# Patient Record
Sex: FEMALE | Race: WHITE | NOT HISPANIC OR LATINO
[De-identification: names, ages, dates, MRNs, and addresses within clinical notes are randomized per-mention and may not be internally consistent; named-entity substitution may affect disease eponyms.]

---

## 2017-03-06 ENCOUNTER — HOSPITAL (OUTPATIENT)
Dept: OTHER | Age: 52
End: 2017-03-06
Attending: OBSTETRICS & GYNECOLOGY

## 2018-03-07 ENCOUNTER — HOSPITAL (OUTPATIENT)
Dept: OTHER | Age: 53
End: 2018-03-07
Attending: OBSTETRICS & GYNECOLOGY

## 2019-03-11 ENCOUNTER — HOSPITAL (OUTPATIENT)
Dept: OTHER | Age: 54
End: 2019-03-11
Attending: OBSTETRICS & GYNECOLOGY

## 2019-03-28 ENCOUNTER — GENETICS ENCOUNTER (OUTPATIENT)
Dept: GENETICS | Facility: HOSPITAL | Age: 54
End: 2019-03-28
Attending: GENETIC COUNSELOR, MS
Payer: COMMERCIAL

## 2019-03-28 PROCEDURE — 96040 HC GENETIC COUNSELING EA 30 MIN: CPT | Performed by: GENETIC COUNSELOR, MS

## 2019-03-28 NOTE — PROGRESS NOTES
Referring Provider: Eduardo Shepherd MD    Reason for Referral:  Fortunato Coyle was referred for genetic counseling because of a family history of cancer  Ms. Singh is a 52year-old woman of [facility] [place].  Ms. Abena Lehman has no personal history o genetic predisposition to developing cancer.  Such characteristics include having multiple family members on the same side developing cancer, individuals developing cancer at a younger age, and individuals who have developed a rare or uncommon type of cance impact management for a patient who tests positive for one of these cancer predisposition genes. If Ms. Singh is found to carry a deleterious pathogenic variant in a cancer predisposition gene (positive result), she is at significantly increased ris physician. A variant of uncertain significance is a DNA change that may or may not alter the function of the gene; therefore, it is usually not possible to determine if the gene variant is responsible for an individual’s increased cancer risk.      Can variants as part of a multigene panel is recommended. At the conclusion of the counseling session MsCatalina Samantha decided to proceed with a [facility] Common Hereditary Cancer gene panel. Written consent was obtained.   Blood and paperwork were sent to [facility]

## 2019-04-12 ENCOUNTER — GENETICS ENCOUNTER (OUTPATIENT)
Dept: HEMATOLOGY/ONCOLOGY | Facility: HOSPITAL | Age: 54
End: 2019-04-12

## 2019-04-12 NOTE — PROGRESS NOTES
Referring Provider:       Duane Adam MD    Reason for Referral:  Paresh Tapia had genetic testing performed on 3/28/19 because of a family history of breast cancer.      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

## 2019-04-16 ENCOUNTER — GENETICS ENCOUNTER (OUTPATIENT)
Dept: GENETICS | Facility: HOSPITAL | Age: 54
End: 2019-04-16
Attending: GENETIC COUNSELOR, MS
Payer: COMMERCIAL

## 2019-04-16 PROCEDURE — 96040 HC GENETIC COUNSELING EA 30 MIN: CPT | Performed by: GENETIC COUNSELOR, MS

## 2019-04-16 NOTE — PROGRESS NOTES
Referring Provider:       Mg Law MD     Reason for Referral:  Marvin Steen returned today to review the results of her 3/28/19 genetic testing. To briefly review, Ms. Singh is a 52year-old woman who has no personal history of cancer who was future.     Medical Recommendations for Carriers of RAD51D Pathogenic Variants:  Current NCCN guidelines (Version 3.2019 Genetic/Familial High-Risk assessment:  Breast and Ovarian) suggest that female RAD51D carriers should consider risk-reducing salpingo-o ovarian cancer. Current NCCN Guidelines recommend that RAD51D carriers should discuss risk-reducing salpingo-oophorectomy beginning around age 36-53 (or earlier if there is a family history of early-onset ovarian cancer).   MsCatalina Kourtneyjorge العلي states that she is o

## 2019-06-17 ENCOUNTER — ANESTHESIA EVENT (OUTPATIENT)
Dept: SURGERY | Facility: HOSPITAL | Age: 54
End: 2019-06-17
Payer: COMMERCIAL

## 2019-06-18 ENCOUNTER — HOSPITAL ENCOUNTER (OUTPATIENT)
Facility: HOSPITAL | Age: 54
Setting detail: HOSPITAL OUTPATIENT SURGERY
Discharge: HOME | End: 2019-06-18
Attending: OBSTETRICS & GYNECOLOGY | Admitting: OBSTETRICS & GYNECOLOGY
Payer: COMMERCIAL

## 2019-06-18 ENCOUNTER — ANESTHESIA (OUTPATIENT)
Dept: SURGERY | Facility: HOSPITAL | Age: 54
End: 2019-06-18
Payer: COMMERCIAL

## 2019-06-18 VITALS
BODY MASS INDEX: 22.68 KG/M2 | HEIGHT: 66 IN | HEART RATE: 97 BPM | OXYGEN SATURATION: 99 % | SYSTOLIC BLOOD PRESSURE: 125 MMHG | WEIGHT: 141.13 LBS | DIASTOLIC BLOOD PRESSURE: 75 MMHG | RESPIRATION RATE: 16 BRPM | TEMPERATURE: 97 F

## 2019-06-18 PROCEDURE — 0DNW4ZZ RELEASE PERITONEUM, PERCUTANEOUS ENDOSCOPIC APPROACH: ICD-10-PCS | Performed by: OBSTETRICS & GYNECOLOGY

## 2019-06-18 PROCEDURE — 88305 TISSUE EXAM BY PATHOLOGIST: CPT | Performed by: OBSTETRICS & GYNECOLOGY

## 2019-06-18 PROCEDURE — 8E0W4CZ ROBOTIC ASSISTED PROCEDURE OF TRUNK REGION, PERCUTANEOUS ENDOSCOPIC APPROACH: ICD-10-PCS | Performed by: OBSTETRICS & GYNECOLOGY

## 2019-06-18 PROCEDURE — 0UT24ZZ RESECTION OF BILATERAL OVARIES, PERCUTANEOUS ENDOSCOPIC APPROACH: ICD-10-PCS | Performed by: OBSTETRICS & GYNECOLOGY

## 2019-06-18 PROCEDURE — 0UT74ZZ RESECTION OF BILATERAL FALLOPIAN TUBES, PERCUTANEOUS ENDOSCOPIC APPROACH: ICD-10-PCS | Performed by: OBSTETRICS & GYNECOLOGY

## 2019-06-18 RX ORDER — SODIUM CHLORIDE, SODIUM LACTATE, POTASSIUM CHLORIDE, CALCIUM CHLORIDE 600; 310; 30; 20 MG/100ML; MG/100ML; MG/100ML; MG/100ML
INJECTION, SOLUTION INTRAVENOUS CONTINUOUS
Status: DISCONTINUED | OUTPATIENT
Start: 2019-06-18 | End: 2019-06-18

## 2019-06-18 RX ORDER — LABETALOL HYDROCHLORIDE 5 MG/ML
5 INJECTION, SOLUTION INTRAVENOUS EVERY 5 MIN PRN
Status: DISCONTINUED | OUTPATIENT
Start: 2019-06-18 | End: 2019-06-18

## 2019-06-18 RX ORDER — CEFAZOLIN SODIUM/WATER 2 G/20 ML
SYRINGE (ML) INTRAVENOUS
Status: DISCONTINUED | OUTPATIENT
Start: 2019-06-18 | End: 2019-06-18 | Stop reason: HOSPADM

## 2019-06-18 RX ORDER — BUPIVACAINE HYDROCHLORIDE AND EPINEPHRINE 5; 5 MG/ML; UG/ML
INJECTION, SOLUTION EPIDURAL; INTRACAUDAL; PERINEURAL AS NEEDED
Status: DISCONTINUED | OUTPATIENT
Start: 2019-06-18 | End: 2019-06-18 | Stop reason: HOSPADM

## 2019-06-18 RX ORDER — ACETAMINOPHEN 500 MG
1000 TABLET ORAL ONCE AS NEEDED
Status: DISCONTINUED | OUTPATIENT
Start: 2019-06-18 | End: 2019-06-18

## 2019-06-18 RX ORDER — HYDROCODONE BITARTRATE AND ACETAMINOPHEN 5; 325 MG/1; MG/1
1 TABLET ORAL AS NEEDED
Status: COMPLETED | OUTPATIENT
Start: 2019-06-18 | End: 2019-06-18

## 2019-06-18 RX ORDER — ACETAMINOPHEN 500 MG
500 TABLET ORAL EVERY 6 HOURS PRN
COMMUNITY

## 2019-06-18 RX ORDER — HYDROCODONE BITARTRATE AND ACETAMINOPHEN 5; 325 MG/1; MG/1
2 TABLET ORAL AS NEEDED
Status: COMPLETED | OUTPATIENT
Start: 2019-06-18 | End: 2019-06-18

## 2019-06-18 RX ORDER — HYDROMORPHONE HYDROCHLORIDE 1 MG/ML
0.4 INJECTION, SOLUTION INTRAMUSCULAR; INTRAVENOUS; SUBCUTANEOUS EVERY 5 MIN PRN
Status: DISCONTINUED | OUTPATIENT
Start: 2019-06-18 | End: 2019-06-18

## 2019-06-18 RX ORDER — IBUPROFEN 200 MG
600 TABLET ORAL ONCE AS NEEDED
Status: DISCONTINUED | OUTPATIENT
Start: 2019-06-18 | End: 2019-06-18

## 2019-06-18 RX ORDER — DEXAMETHASONE SODIUM PHOSPHATE 4 MG/ML
4 VIAL (ML) INJECTION AS NEEDED
Status: DISCONTINUED | OUTPATIENT
Start: 2019-06-18 | End: 2019-06-18

## 2019-06-18 RX ORDER — METOCLOPRAMIDE HYDROCHLORIDE 5 MG/ML
10 INJECTION INTRAMUSCULAR; INTRAVENOUS AS NEEDED
Status: DISCONTINUED | OUTPATIENT
Start: 2019-06-18 | End: 2019-06-18

## 2019-06-18 RX ORDER — ONDANSETRON 2 MG/ML
4 INJECTION INTRAMUSCULAR; INTRAVENOUS AS NEEDED
Status: DISCONTINUED | OUTPATIENT
Start: 2019-06-18 | End: 2019-06-18

## 2019-06-18 RX ORDER — NALOXONE HYDROCHLORIDE 0.4 MG/ML
80 INJECTION, SOLUTION INTRAMUSCULAR; INTRAVENOUS; SUBCUTANEOUS AS NEEDED
Status: DISCONTINUED | OUTPATIENT
Start: 2019-06-18 | End: 2019-06-18

## 2019-06-18 RX ORDER — ACETAMINOPHEN 500 MG
1000 TABLET ORAL ONCE
Status: DISCONTINUED | OUTPATIENT
Start: 2019-06-18 | End: 2019-06-18

## 2019-06-18 NOTE — OPERATIVE REPORT
[facility]    PATIENT'S NAME: Alecia Denson   ATTENDING PHYSICIAN: Reese Otero M.D. OPERATING PHYSICIAN: Reese Otero M.D.    PATIENT ACCOUNT#:   [de-identified]    LOCATION:  [address] [place] [address] 10  MEDICAL RECORD #:   [id number] was used to insufflate the abdominopelvic cavity. The 12 mm trocar was then placed. The 5 mm video camera was placed inside the abdominopelvic cavity, and the abdominopelvic cavity was visualized.   The abdominopelvic cavity was noted to be adequately ent of the bowel on this side, and the adhesions to the pelvic sidewall of the ovary were more extensive on the patient's left, but continued dissection and lysis of adhesions were performed, and this ovary and fallopian tube were also freed.   Adequate hemosta

## 2019-06-18 NOTE — ANESTHESIA POSTPROCEDURE EVALUATION
[facility] Cortez Lindau Patient Status:  Hospital Outpatient Surgery   Age/Gender 62year old female MRN [id number]   Location [address] [facility] Attending Romy Lucero MD   Hosp Day # 0 PCP No primary care provider o

## 2019-06-18 NOTE — ANESTHESIA PREPROCEDURE EVALUATION
PRE-OP EVALUATION    Patient Name: Hola Singh    Pre-op Diagnosis: FAMILT HISTORY OF BREAST CANCER  RAD 51D  VARIANT MODERATE/HIGH RISK ,OVARIAN CANCER RISK    Procedure(s):  DAVINCI ROBOT ASSISTED LAPAROSCOPIC BILATERAL SALPINGO-OOPHORECTOMY    Surg Other findings            ASA: 1   Plan: general  NPO status verified and patient meets guidelines. Post-procedure pain management plan discussed with surgeon and patient. Comment: Plan GETA, ASA monitors, 1 PIV, routine recovery.   Risks of sore thro

## 2019-06-18 NOTE — BRIEF OP NOTE
[facility]  Post-Op Procedure Note    Mary Sauer Patient Status:  Hospital Outpatient Surgery    1965 MRN [id number]   [facility] POST ANESTHESIA CARE UNIT Attending Martin Davenport MD   Hosp Day # 0 PCP No primary care pr

## 2019-06-18 NOTE — H&P
[facility]    PATIENT'S NAME: Sheryl Bell   ATTENDING PHYSICIAN: Maria G Monique M.D.    PATIENT ACCOUNT#:   [de-identified]    LOCATION:    MEDICAL RECORD #:   [id number]       YOB: 1965  ADMISSION DATE:       06/18/2019    HISTORY drug allergies. No latex allergy. FAMILY HISTORY:  Father had throat cancer at age 76. Mother had type 2 diabetes at age 48. Grandmother also had type 2 diabetes. Her mother had breast cancer at age 58; she had bilateral mastectomies.   Father had sk has been counseled extensively regarding her options of observation versus increased surveillance versus risk-reducing bilateral salpingo-oophorectomy. The patient desires to proceed with risk-reducing bilateral salpingo-oophorectomy.   The procedure and r

## 2020-02-25 DIAGNOSIS — Z12.31 ENCOUNTER FOR SCREENING MAMMOGRAM FOR MALIGNANT NEOPLASM OF BREAST: Primary | ICD-10-CM

## 2020-03-02 ENCOUNTER — HOSPITAL ENCOUNTER (OUTPATIENT)
Dept: MAMMOGRAPHY | Age: 55
Discharge: HOME | End: 2020-03-02

## 2020-03-02 DIAGNOSIS — Z12.31 ENCOUNTER FOR SCREENING MAMMOGRAM FOR MALIGNANT NEOPLASM OF BREAST: ICD-10-CM

## 2020-03-02 PROCEDURE — 77063 BREAST TOMOSYNTHESIS BI: CPT

## 2021-03-02 ENCOUNTER — HOSPITAL ENCOUNTER (OUTPATIENT)
Dept: MAMMOGRAPHY | Age: 56
Discharge: HOME | End: 2021-03-02

## 2021-03-02 DIAGNOSIS — Z12.31 ENCOUNTER FOR SCREENING MAMMOGRAM FOR MALIGNANT NEOPLASM OF BREAST: ICD-10-CM

## 2021-03-02 PROCEDURE — 77063 BREAST TOMOSYNTHESIS BI: CPT

## 2022-03-14 ENCOUNTER — HOSPITAL ENCOUNTER (OUTPATIENT)
Dept: MAMMOGRAPHY | Age: 57
Discharge: HOME | End: 2022-03-14

## 2022-03-14 ENCOUNTER — HOSPITAL ENCOUNTER (OUTPATIENT)
Dept: GENERAL RADIOLOGY | Age: 57
Discharge: HOME | End: 2022-03-14

## 2022-03-14 DIAGNOSIS — Z13.820 SCREENING FOR OSTEOPOROSIS: ICD-10-CM

## 2022-03-14 DIAGNOSIS — Z12.39 SCREENING FOR BREAST CANCER: ICD-10-CM

## 2022-03-14 PROCEDURE — 77080 DXA BONE DENSITY AXIAL: CPT

## 2022-03-14 PROCEDURE — 77063 BREAST TOMOSYNTHESIS BI: CPT

## 2022-03-21 ENCOUNTER — HOSPITAL ENCOUNTER (OUTPATIENT)
Dept: MAMMOGRAPHY | Age: 57
Discharge: HOME | End: 2022-03-21

## 2022-03-21 ENCOUNTER — DOCUMENTATION (OUTPATIENT)
Dept: MAMMOGRAPHY | Age: 57
End: 2022-03-21

## 2022-03-21 DIAGNOSIS — R92.8 ABNORMAL SCREENING MAMMOGRAM: ICD-10-CM

## 2022-03-21 DIAGNOSIS — R92.1 BREAST CALCIFICATION, RIGHT: ICD-10-CM

## 2022-03-21 PROCEDURE — G0279 TOMOSYNTHESIS, MAMMO: HCPCS

## 2022-03-24 ENCOUNTER — HOSPITAL ENCOUNTER (OUTPATIENT)
Dept: MAMMOGRAPHY | Age: 57
Discharge: HOME | End: 2022-03-24

## 2022-03-24 ENCOUNTER — DOCUMENTATION (OUTPATIENT)
Dept: MAMMOGRAPHY | Age: 57
End: 2022-03-24

## 2022-03-24 DIAGNOSIS — R92.1 BREAST CALCIFICATION, RIGHT: ICD-10-CM

## 2022-03-24 PROCEDURE — 10006023 HB SUPPLY 272

## 2022-03-24 PROCEDURE — 88305 TISSUE EXAM BY PATHOLOGIST: CPT

## 2022-03-24 PROCEDURE — 10006027 HB SUPPLY 278

## 2022-03-24 PROCEDURE — 19081 BX BREAST 1ST LESION STRTCTC: CPT

## 2022-03-24 PROCEDURE — 10002801 HB RX 250 W/O HCPCS: Performed by: RADIOLOGY

## 2022-03-24 PROCEDURE — A4648 IMPLANTABLE TISSUE MARKER: HCPCS

## 2022-03-24 RX ORDER — LIDOCAINE HYDROCHLORIDE 20 MG/ML
1 INJECTION, SOLUTION INFILTRATION; PERINEURAL ONCE
Status: COMPLETED | OUTPATIENT
Start: 2022-03-24 | End: 2022-03-24

## 2022-03-24 RX ORDER — LIDOCAINE HYDROCHLORIDE AND EPINEPHRINE BITARTRATE 20; .01 MG/ML; MG/ML
10 INJECTION, SOLUTION SUBCUTANEOUS ONCE
Status: COMPLETED | OUTPATIENT
Start: 2022-03-24 | End: 2022-03-24

## 2022-03-24 RX ADMIN — LIDOCAINE HYDROCHLORIDE,EPINEPHRINE BITARTRATE 10 ML: 20; .01 INJECTION, SOLUTION INFILTRATION; PERINEURAL at 08:27

## 2022-03-24 RX ADMIN — LIDOCAINE HYDROCHLORIDE 20 MG: 20 INJECTION, SOLUTION INFILTRATION; PERINEURAL at 08:25

## 2022-03-25 ENCOUNTER — TELEPHONE (OUTPATIENT)
Dept: MAMMOGRAPHY | Age: 57
End: 2022-03-25

## 2022-03-25 LAB
ASR DISCLAIMER: NORMAL
CASE RPRT: NORMAL
CLINICAL INFO: NORMAL
PATH REPORT.FINAL DX SPEC: NORMAL
PATH REPORT.GROSS SPEC: NORMAL

## 2022-07-19 ENCOUNTER — OFFICE VISIT (OUTPATIENT)
Facility: LOCATION | Age: 57
End: 2022-07-19
Payer: COMMERCIAL

## 2022-07-19 VITALS
SYSTOLIC BLOOD PRESSURE: 125 MMHG | BODY MASS INDEX: 22 KG/M2 | WEIGHT: 133.19 LBS | DIASTOLIC BLOOD PRESSURE: 84 MMHG | HEART RATE: 73 BPM

## 2022-07-19 DIAGNOSIS — R63.4 UNINTENTIONAL WEIGHT LOSS: ICD-10-CM

## 2022-07-19 DIAGNOSIS — Z83.71 FAMILY HISTORY OF COLONIC POLYPS: ICD-10-CM

## 2022-07-19 DIAGNOSIS — R19.4 CHANGE IN BOWEL HABITS: Primary | ICD-10-CM

## 2022-07-19 PROCEDURE — 3074F SYST BP LT 130 MM HG: CPT

## 2022-07-19 PROCEDURE — S0285 CNSLT BEFORE SCREEN COLONOSC: HCPCS

## 2022-07-19 PROCEDURE — 3079F DIAST BP 80-89 MM HG: CPT

## 2022-07-19 RX ORDER — POLYETHYLENE GLYCOL 3350, SODIUM CHLORIDE, SODIUM BICARBONATE, POTASSIUM CHLORIDE 420; 11.2; 5.72; 1.48 G/4L; G/4L; G/4L; G/4L
POWDER, FOR SOLUTION ORAL
Qty: 1 EACH | Refills: 0 | Status: SHIPPED | OUTPATIENT
Start: 2022-07-19

## 2022-09-13 DIAGNOSIS — R63.4 UNINTENTIONAL WEIGHT LOSS: Primary | ICD-10-CM

## 2022-10-31 ENCOUNTER — TELEPHONE (OUTPATIENT)
Facility: LOCATION | Age: 57
End: 2022-10-31

## 2022-11-08 ENCOUNTER — HOSPITAL ENCOUNTER (OUTPATIENT)
Facility: HOSPITAL | Age: 57
Setting detail: HOSPITAL OUTPATIENT SURGERY
Discharge: HOME | End: 2022-11-08
Attending: COLON & RECTAL SURGERY | Admitting: COLON & RECTAL SURGERY
Payer: COMMERCIAL

## 2022-11-08 ENCOUNTER — ANESTHESIA (OUTPATIENT)
Dept: ENDOSCOPY | Facility: HOSPITAL | Age: 57
End: 2022-11-08
Payer: COMMERCIAL

## 2022-11-08 ENCOUNTER — ANESTHESIA EVENT (OUTPATIENT)
Dept: ENDOSCOPY | Facility: HOSPITAL | Age: 57
End: 2022-11-08
Payer: COMMERCIAL

## 2022-11-08 VITALS
HEART RATE: 78 BPM | BODY MASS INDEX: 20.73 KG/M2 | SYSTOLIC BLOOD PRESSURE: 100 MMHG | RESPIRATION RATE: 18 BRPM | TEMPERATURE: 99 F | HEIGHT: 66 IN | OXYGEN SATURATION: 100 % | WEIGHT: 129 LBS | DIASTOLIC BLOOD PRESSURE: 61 MMHG

## 2022-11-08 DIAGNOSIS — R19.4 CHANGE IN BOWEL HABITS: ICD-10-CM

## 2022-11-08 DIAGNOSIS — R63.4 UNINTENTIONAL WEIGHT LOSS: ICD-10-CM

## 2022-11-08 DIAGNOSIS — Z83.71 FAMILY HISTORY OF COLONIC POLYPS: ICD-10-CM

## 2022-11-08 PROCEDURE — 88305 TISSUE EXAM BY PATHOLOGIST: CPT | Performed by: COLON & RECTAL SURGERY

## 2022-11-08 PROCEDURE — 0DBE8ZX EXCISION OF LARGE INTESTINE, VIA NATURAL OR ARTIFICIAL OPENING ENDOSCOPIC, DIAGNOSTIC: ICD-10-PCS | Performed by: COLON & RECTAL SURGERY

## 2022-11-08 RX ORDER — SODIUM CHLORIDE, SODIUM LACTATE, POTASSIUM CHLORIDE, CALCIUM CHLORIDE 600; 310; 30; 20 MG/100ML; MG/100ML; MG/100ML; MG/100ML
INJECTION, SOLUTION INTRAVENOUS CONTINUOUS
Status: DISCONTINUED | OUTPATIENT
Start: 2022-11-08 | End: 2022-11-08

## 2022-11-08 RX ORDER — NALOXONE HYDROCHLORIDE 0.4 MG/ML
80 INJECTION, SOLUTION INTRAMUSCULAR; INTRAVENOUS; SUBCUTANEOUS AS NEEDED
Status: DISCONTINUED | OUTPATIENT
Start: 2022-11-08 | End: 2022-11-08

## 2022-11-08 RX ORDER — LIDOCAINE HYDROCHLORIDE 10 MG/ML
INJECTION, SOLUTION EPIDURAL; INFILTRATION; INTRACAUDAL; PERINEURAL AS NEEDED
Status: DISCONTINUED | OUTPATIENT
Start: 2022-11-08 | End: 2022-11-08 | Stop reason: SURG

## 2022-11-08 RX ADMIN — LIDOCAINE HYDROCHLORIDE 50 MG: 10 INJECTION, SOLUTION EPIDURAL; INFILTRATION; INTRACAUDAL; PERINEURAL at 07:22:00

## 2022-11-08 NOTE — OPERATIVE REPORT
[facility]  Op Note    Fly Fine Location: OR   CSN [id number] MRN [id number]   Admission Date 11/8/2022 Operation Date 11/8/2022   Attending Physician Mariana Yeboah MD Operating Physician Huber Judd MD     Pre-Operative Diagnosis: Unintentional weight loss [R63.4]  Change in bowel habits [R19.4]  Family history of colonic polyps [Z83.71]    Post-Operative Diagnosis: Colon polyp, rectum, 6 mm, at 8 cm    Procedure Performed: Colonoscopy with hot snare polypectomy    Surgeon: Huber Judd MD    Anesthesia: MAC      History of present illness: This patient presents for her first ever colonoscopy. She has had weight loss, some changes in bowel habits, she has a family history of colon polyps. Operative findings:  Single colon polyp just above the second rectal fold at approximately 8 cm from the anal verge. It is 6 mm, on a narrow stalk, it was removed with hot snare polypectomy. No evidence of colitis neoplasm or inflammation, no evidence of diverticulosis. Operative summary:  Following adequate IV sedation, the patient was placed in the left lateral decubitus position on the operating room table. Digital rectal examination was performed. The colonoscope was inserted, and passed to the end of the colon. Upon withdrawal of the scope, the prep was rated as follows and the [facility] bowel prep scale:  3    0. Unprepared colon segment with stool but cannot be cleared  1. Portion of mucosa in segments seen after cleaning, but other areas not seen because of retained material  2. Minor residual material after cleaning, but mucosa of segment generally well seen  3.   Entire mucosa of segment well seen after cleaning        Digital rectal examination was performed, the rectal exam had the following findings:   Minimal anterior rectocele, small external hemorrhoids    Landmarks were identified confirming the location of the colonoscope in the cecum, including the transverse cecal fold, and the ileocecal valve. Upon withdrawal of the scope, the patient had the following findings:  The patient does have polyps. The patient does not have diverticulosis. The patient does not have inflammation. This patient has not had a previous colon resection. The scope was retroverted in the anal canal, the anal canal had the following findings:    The patient does not have internal hemorrhoids. The patient does not have anal canal polyps. The patient does not have hypertrophied anal papillae. The procedures performed during the procedure other than colonoscopy are listed as follows: Hot snare polypectomy at 8 cm    The scope was withdrawn, the patient was taken to the recovery room in stable postoperative condition. Pathologic specimens:  Rectal polyp at 8 cm    Complications: None      Addendum: This patient has a biopsy pending. If the biopsy reveals an adenoma, we will placed this patient on our recall system to return in 3 years for further colonoscopy. If the patient's biopsy is nondiagnostic or hyperplastic, the patient can then be returned to the screening population. If the patient is ultimately returned to the screening population, current recommendations call for colonoscopy every 10 years starting at age 39. If this patient displays any signs and symptoms consistent with colon cancer, and are back on the screening program, they should return to my attention immediately, even if it's prior to 10 years, or age 39.        Phoebe Malhotra MD  11/8/2022  7:39 AM

## 2022-11-08 NOTE — ANESTHESIA PROCEDURE NOTES
Peripheral IV  Date/Time: 11/8/2022 7:15 AM  Inserted by:  Corie Cooper MD    Placement  Needle size: 22 G  Laterality: right  Location: hand  Local anesthetic: none  Site prep: alcohol  Technique: anatomical landmarks  Attempts: 1

## 2022-11-08 NOTE — ADDENDUM NOTE
Addendum  created 11/08/22 1011 by Hector Hope MD    Child order released for a procedure order, Clinical Note Signed, Intraprocedure Blocks edited, SmartForm saved

## 2022-11-08 NOTE — ANESTHESIA POSTPROCEDURE EVALUATION
[facility]    Vinod Arroyo Patient Status:  Hospital Outpatient Surgery   Age/Gender 59year old female MRN [id number]   Location [address] [facility] 28 Attending No att. providers found   [address] Day # 0 PCP David Lopez MD       Anesthesia Post-op Note    COLONOSCOPY with hot snare polypectomy    Procedure Summary     Date: 11/08/22 Room / Location: [facility] ENDOSCOPY 03 / [facility] ENDOSCOPY    Anesthesia Start: 0720 Anesthesia Stop: 6974    Procedure: COLONOSCOPY with hot snare polypectomy Diagnosis:       Unintentional weight loss      Change in bowel habits      Family history of colonic polyps      (Colon polyp)    Surgeons: Adonis Tabares MD Anesthesiologist: Quyen Hawkins MD    Anesthesia Type: general ASA Status: 1          Anesthesia Type: general    Vitals Value Taken Time   BP 97/63 11/08/22 0810   Temp 98 11/08/22 0831   Pulse 78 11/08/22 0810   Resp 18 11/08/22 0810   SpO2 99 % 11/08/22 0810   Vitals shown include unvalidated device data. Patient Location: Endoscopy    Anesthesia Type: MAC    Airway Patency: patent    Postop Pain Control: adequate    Mental Status: mildly sedated but able to meaningfully participate in the post-anesthesia evaluation    Nausea/Vomiting: none    Cardiopulmonary/Hydration status: stable euvolemic    Complications: no apparent anesthesia related complications    Postop vital signs: stable    Dental Exam: Unchanged from Preop    Patient to be discharged home when criteria met.

## 2022-11-08 NOTE — DISCHARGE INSTRUCTIONS
Home Care Instructions for Colonoscopy with Sedation    Diet:  - Resume your regular diet as tolerated. - Start with light meals to minimize bloating.  - Do not drink alcohol today. Medication:  - If you have questions about resuming your normal medications, please contact your Primary Care Physician. Activities:  - Take it easy today. Do not return to work today. - Do not drive today. - Do not operate any machinery today (including kitchen equipment). Colonoscopy:  - You may notice some rectal \"spotting\" (a little blood on the toilet tissue) for a day or two after the exam. This is normal.  - If you experience any rectal bleeding (not spotting), persistent tenderness or sharp severe abdominal pains, oral temperature over 100 degrees Fahrenheit, light-headedness or dizziness, or any other problems, contact your doctor. **If unable to reach your doctor, please go to the [facility] Emergency Room**    - Your referring physician will receive a full report of your examination.  - If you do not hear from your doctor's office within two weeks of your biopsy, please call them for your results. You may be able to see your laboratory results in [facility] between 4 and 7 business days. In some cases, your physician may not have viewed the results before they are released to [address] If you have questions regarding your results contact the physician who ordered the test/exam by phone or via [address] by choosing \"Ask a Medical Question. \"

## 2022-11-10 NOTE — PROGRESS NOTES
The patient had a benign polyp. It is the type that normally needs a follow up colonoscopy in 3 years. Check the chart and colonoscopy results sheet to see if they have been requested to come back in less than 3 years, some will be called sooner for large polyps or greater than 5 polyps, or a sessile polyp for reevaluation. It may be listed on the colonoscopy results sheet. Call the patient and give them the result, put the patient in the recall system for the appropriate recall listed on the paperwork.

## 2022-12-29 ENCOUNTER — PATIENT OUTREACH (OUTPATIENT)
Facility: LOCATION | Age: 57
End: 2022-12-29

## 2023-03-16 ENCOUNTER — HOSPITAL ENCOUNTER (OUTPATIENT)
Dept: MAMMOGRAPHY | Age: 58
Discharge: HOME | End: 2023-03-16

## 2023-03-16 DIAGNOSIS — Z12.31 ENCOUNTER FOR SCREENING MAMMOGRAM FOR BREAST CANCER: ICD-10-CM

## 2023-03-16 PROCEDURE — 77063 BREAST TOMOSYNTHESIS BI: CPT

## 2023-05-03 ENCOUNTER — PATIENT OUTREACH (OUTPATIENT)
Facility: LOCATION | Age: 58
End: 2023-05-03

## 2024-02-19 DIAGNOSIS — Z12.31 ENCOUNTER FOR SCREENING MAMMOGRAM FOR MALIGNANT NEOPLASM OF BREAST: Primary | ICD-10-CM

## 2024-03-19 ENCOUNTER — HOSPITAL ENCOUNTER (OUTPATIENT)
Dept: MAMMOGRAPHY | Age: 59
Discharge: HOME | End: 2024-03-19
Attending: OBSTETRICS & GYNECOLOGY

## 2024-03-19 DIAGNOSIS — Z12.31 ENCOUNTER FOR SCREENING MAMMOGRAM FOR MALIGNANT NEOPLASM OF BREAST: ICD-10-CM

## 2024-03-19 PROCEDURE — 77067 SCR MAMMO BI INCL CAD: CPT

## 2025-02-18 DIAGNOSIS — Z12.31 VISIT FOR SCREENING MAMMOGRAM: Primary | ICD-10-CM

## 2025-03-20 ENCOUNTER — HOSPITAL ENCOUNTER (OUTPATIENT)
Dept: MAMMOGRAPHY | Age: 60
Discharge: HOME | End: 2025-03-20
Attending: OBSTETRICS & GYNECOLOGY

## 2025-03-20 DIAGNOSIS — Z12.31 VISIT FOR SCREENING MAMMOGRAM: ICD-10-CM

## 2025-03-20 PROCEDURE — 77067 SCR MAMMO BI INCL CAD: CPT

## 2025-08-12 DIAGNOSIS — Z80.3 FAMILY HISTORY OF BREAST CANCER IN FIRST DEGREE RELATIVE: Primary | ICD-10-CM

## 2025-08-12 DIAGNOSIS — Z15.09 RAD51D GENE MUTATION POSITIVE: ICD-10-CM

## 2025-08-12 DIAGNOSIS — Z15.01 RAD51D GENE MUTATION POSITIVE: ICD-10-CM

## (undated) DEVICE — FILTERLINE NASAL ADULT O2/CO2

## (undated) DEVICE — SYRINGE 50ML LL TIP

## (undated) DEVICE — INSUFFLATION NEEDLE TO ESTABLISH PNEUMOPERITONEUM.: Brand: INSUFFLATION NEEDLE

## (undated) DEVICE — MEGA NEEDLE DRIVER: Brand: ENDOWRIST;DAVINCI SI

## (undated) DEVICE — 1010 S-DRAPE TOWEL DRAPE 10/BX: Brand: STERI-DRAPE™

## (undated) DEVICE — GAMMEX® PI HYBRID SIZE 5.5, STERILE POWDER-FREE SURGICAL GLOVE, POLYISOPRENE AND NEOPRENE BLEND: Brand: GAMMEX

## (undated) DEVICE — GYN LAP/ROBOTIC: Brand: MEDLINE INDUSTRIES, INC.

## (undated) DEVICE — SUTURE VICRYL 0

## (undated) DEVICE — SNARE CAPTIFLEX MICRO-OVL OLY

## (undated) DEVICE — SOL H2O 1000ML BTL

## (undated) DEVICE — KENDALL SCD EXPRESS SLEEVES, KNEE LENGTH, MEDIUM: Brand: KENDALL SCD

## (undated) DEVICE — DV KIT ACCESSORY 4-ARM

## (undated) DEVICE — AIRSEAL 8 MM ACCESS PORT AND LOW PROFILE OBTURATOR WITH BLADELESS OPTICAL TIP, 100 MM LENGTH: Brand: AIRSEAL

## (undated) DEVICE — DERMABOND LIQUID ADHESIVE

## (undated) DEVICE — 3M™ RED DOT™ MONITORING ELECTRODE WITH FOAM TAPE AND STICKY GEL, 50/BAG, 20/CASE, 72/PLT 2570: Brand: RED DOT™

## (undated) DEVICE — COBRA GRASPER: Brand: ENDOWRIST;DAVINCI SI

## (undated) DEVICE — REM POLYHESIVE ADULT PATIENT RETURN ELECTRODE: Brand: VALLEYLAB

## (undated) DEVICE — TIP COVER ACCESSORY

## (undated) DEVICE — 40580 - THE PINK PAD - ADVANCED TRENDELENBURG POSITIONING KIT: Brand: 40580 - THE PINK PAD - ADVANCED TRENDELENBURG POSITIONING KIT

## (undated) DEVICE — SUTURE MONOCRYL 4-0 PS-2

## (undated) DEVICE — GAMMEX® PI HYBRID SIZE 6, STERILE POWDER-FREE SURGICAL GLOVE, POLYISOPRENE AND NEOPRENE BLEND: Brand: GAMMEX

## (undated) DEVICE — TROCAR: Brand: KII FIOS FIRST ENTRY

## (undated) DEVICE — DV OBTURATOR BLADELESS 8MM

## (undated) DEVICE — SUTURE VLOC 180 2-0 12\" 0315

## (undated) DEVICE — ELECTRO LUBE IS A SINGLE PATIENT USE DEVICE THAT IS INTENDED TO BE USED ON ELECTROSURGICAL ELECTRODES TO REDUCE STICKING.: Brand: KEY SURGICAL ELECTRO LUBE

## (undated) DEVICE — TRI-LUMEN FILTERED TUBE SET WITH ACTIVATED CHARCOAL FILTER: Brand: AIRSEAL

## (undated) DEVICE — TRAP SPEC REMOVAL ETRAP 15CM

## (undated) DEVICE — 1200CC GUARDIAN II: Brand: GUARDIAN

## (undated) DEVICE — Device: Brand: DEFENDO AIR/WATER/SUCTION AND BIOPSY VALVE

## (undated) DEVICE — PK INSTRUMENT CORD, G400 GENERATOR: Brand: PK

## (undated) DEVICE — ENDOSCOPY PACK - LOWER: Brand: MEDLINE INDUSTRIES, INC.